# Patient Record
Sex: FEMALE | Race: ASIAN | NOT HISPANIC OR LATINO | Employment: STUDENT | URBAN - METROPOLITAN AREA
[De-identification: names, ages, dates, MRNs, and addresses within clinical notes are randomized per-mention and may not be internally consistent; named-entity substitution may affect disease eponyms.]

---

## 2024-03-18 NOTE — PROGRESS NOTES
Assessment & Plan     Dysuria  Discussed all her urinary symptoms-discussed these do not sound like typical urinary tract infection caused by a bacteria as symptoms occur for dew days at a time and few hours/day and then resolve. Discussed with her ongoing urinary symptoms, would recommend she be evaluated for other possible etiologies of the bladder symptoms and a Urology referral is entered. Will culture her urine sample today and only treat if positive. She does continue to talk about ongoing/non-resolving urinary tract infection, so I am unsure if she completely understands that it does not appear to be a urinary tract infection at this time (and also after reviewing her UA from  in January).   - UA with Microscopic; Future  - Urine Culture; Future  - UA with Microscopic  - Urine Culture  - Urine Microscopic Exam  - Adult Urology  Referral; Future    PCOS (polycystic ovarian syndrome)  We had a long discussion regarding PCOS. Based on her clinical picture of history of oligomenorrhea and hirsutism, meets criteria for PCOS.  Discussed the implications of PCOS, including increased risk of type II DM, cardiovascular disease, and risk of endometrial hyperplasia or carcinoma. She should be screened for diabetes and hypercholesterolemia annually. Will plan to return to clinic fasting to have labs including A1c completed.  Discussed goals of therapy including: prevention of endometrial hyperplasia and carcinoma from chronic anovulation (though cycles are regular now), contraception for those not pursuing pregnancy, management/identification of underlying metabolic abnormalities and risk reduction for diabetes and cardiovascular disease, amelioration of hyperandrogenic syndromes.  She has concerns with use of hormonal medications and impact of fertility in the future and this is discussed along with options for endometrial protection including combined vs progesterone only hormonal medications.   Discussed  fertility implications of anovulation, potential difficulty conceiving and/or need for ovulation induction.  Discussed weight loss through diet and exercise. A 5-10% reduction in body weight may restore normal menstrual cycles and decrease risks of metabolic syndrome and endometrial hyperplasia. Diet and exersice for weight loss tend to improve insulin resistance and hyperandrogenism. She is requesting referral to [facility] center and this is placed. Can also consider use of Metformin and discussed this medication. Will plan to have patient follow up after labs completed to discuss Metformin vs hormonal medications in further detail. Patient is given an opportunity to ask questions and have them answered.  - Hemoglobin A1c; Future  - Adult Comprehensive Weight Management  Referral; Future  - Follicle stimulating hormone; Future  - Luteinizing Hormone; Future  - Prolactin; Future  - Testosterone Free and Total; Future  - TSH with free T4 reflex; Future  - Glucose; Future  - Lipid panel reflex to direct LDL Fasting; Future    47 minutes spent by me on the date of the encounter doing chart review, history and exam, documentation and further activities per the note       Subjective   Georgiana is a 24 year old, presenting for the following health issues:  UTI    HPI       Genitourinary - Female  Onset/Duration: Since 1/1/2024 intermittent-NO current symptoms-these have resolved at this time.  Description:   Painful urination (Dysuria): YES           Frequency: YES  Blood in urine (Hematuria): No  Delay in urine (Hesitency): YES  Intensity: 8/10  Progression of Symptoms:  intermittent  Accompanying Signs & Symptoms:  Fever/chills: No  Flank pain: YES  Nausea and vomiting: No  Vaginal symptoms: none  Abdominal/Pelvic Pain: No  History:   History of frequent UTI s: YES  History of kidney stones: No  Sexually Active: YES  Possibility of pregnancy: No  Precipitating or alleviating factors: None  Therapies tried and outcome:  Azo    Patient presents with concerns of an ongoing urinary tract infection. Symptoms began in January-when they are present they usually last 2-3 days and are presents for about 2-3 hours during the day and then resolve until the next day. Lately the symptoms seem to be lasting over more days. Uses AZO and an OTC (non antibiotic) medication from [facility] to resolve symptoms. Has also previously used an antibiotic from [facility] for the symptoms. Was seen at  in January for urinary symptoms (unable to pull records in  but patient has UA results visible on her phone). Review of that UA shows it was negative, no culture was done. Patient was not treated at that time with antibiotics, but states she has used antibiotics in the past for symptoms. When present, experiences dysuria, urinary frequency, urgency, voiding small amount, not feeling as though she is completely emptying and has a hard time holding the urine. Occasional low back pain, and teeth pain. All these symptoms can last 2-3 hours and then resolve-except the difficulty holding her urine seems to be ongoing.   Denies hematuria, episodes of incontinence, pelvic pain, nausea, vomiting, fever, abnormal vaginal symptoms. No bowel concerns. No prior bladder surgery, bladder injury. No known pelvic surgery, ovarian cysts, uterine fibroids/polyps.  Symptoms currently have resolved as of 2 days ago. Is sexually active, no STI concerns. Concerned this ongoing urinary tract infection is going to cause fertility issues in a few years when they are ready to start trying for pregnancy.     Additionally, would like to discuss PCOS. Patient was diagnosed in [facility] several years ago due to irregular menstrual cycles and hirsutism primarily on abdomen face. Per patient, at that time a pelvic ultrasound was normal. She started a medication that was a nutritional supplement and helped manage blood sugar levels and her cycles became normal-monthly. Has not been on it in some time.  "Cycles since November have been monthly, last 3 days with first being heavy and then the next 2 being minimal. Prior to November, would have episodes of 2-3 months between cycles, but not consistently-could still have some monthly cycles. She is sexually active, not desiring pregnancy at this time. Expresses frustration that she is not able to lose weight, has tried some diet changes, physical activity is minimal. Had been given a referral to a weight management center from  provider last year, but never followed up. She is still interested in assistance with weight loss as she knows it would be beneficial for her PCOS. Has not had fasting labs, A1c in some time. Does request labs to check hormones. Believes last pap smear was 2-3 years ago.    Review of Systems  Constitutional, neuro, ENT, endocrine, pulmonary, cardiac, gastrointestinal, genitourinary, musculoskeletal, integument and psychiatric systems are negative, except as otherwise noted.      Objective    /82 (BP Location: Right arm, Patient Position: Sitting, Cuff Size: Adult Large)   Pulse 81   Temp 98.2  F (36.8  C) (Oral)   Ht 1.702 m (5' 7\")   Wt 101.4 kg (223 lb 9.6 oz)   LMP 02/29/2024 (Exact Date)   SpO2 98%   BMI 35.02 kg/m    Body mass index is 35.02 kg/m .  Physical Exam   GENERAL: alert and no distress  MS: no gross musculoskeletal defects noted, no edema  SKIN: no suspicious lesions or rashes  PSYCH: mentation appears normal, affect normal/bright    Results for orders placed or performed in visit on 03/19/24 (from the past 24 hour(s))   UA with Microscopic   Result Value Ref Range    Color Urine Yellow Colorless, Straw, Light Yellow, Yellow    Appearance Urine Slightly Cloudy (A) Clear    Glucose Urine Negative Negative mg/dL    Bilirubin Urine Negative Negative    Ketones Urine Negative Negative mg/dL    Specific Gravity Urine 1.020 1.003 - 1.035    Blood Urine Negative Negative    pH Urine 7.0 5.0 - 7.0    Protein Albumin Urine " Negative Negative mg/dL    Urobilinogen Urine 0.2 0.2, 1.0 E.U./dL    Nitrite Urine Negative Negative    Leukocyte Esterase Urine Trace (A) Negative   Urine Microscopic Exam   Result Value Ref Range    RBC Urine 0-2 0-2 /HPF /HPF    WBC Urine 0-5 0-5 /HPF /HPF    Squamous Epithelials Urine Moderate (A) None Seen /LPF    Amorphous Crystals Urine Few (A) None Seen /HPF       Signed Electronically by: IRIS Loyola CNP

## 2024-03-19 ENCOUNTER — OFFICE VISIT (OUTPATIENT)
Dept: OBGYN | Facility: CLINIC | Age: 24
End: 2024-03-19
Payer: COMMERCIAL

## 2024-03-19 VITALS
OXYGEN SATURATION: 98 % | BODY MASS INDEX: 35.09 KG/M2 | HEIGHT: 67 IN | WEIGHT: 223.6 LBS | TEMPERATURE: 98.2 F | DIASTOLIC BLOOD PRESSURE: 82 MMHG | SYSTOLIC BLOOD PRESSURE: 135 MMHG | HEART RATE: 81 BPM

## 2024-03-19 DIAGNOSIS — R30.0 DYSURIA: Primary | ICD-10-CM

## 2024-03-19 DIAGNOSIS — E28.2 PCOS (POLYCYSTIC OVARIAN SYNDROME): ICD-10-CM

## 2024-03-19 LAB
ALBUMIN UR-MCNC: NEGATIVE MG/DL
AMORPH CRY #/AREA URNS HPF: ABNORMAL /HPF
APPEARANCE UR: ABNORMAL
BILIRUB UR QL STRIP: NEGATIVE
COLOR UR AUTO: YELLOW
GLUCOSE UR STRIP-MCNC: NEGATIVE MG/DL
HGB UR QL STRIP: NEGATIVE
KETONES UR STRIP-MCNC: NEGATIVE MG/DL
LEUKOCYTE ESTERASE UR QL STRIP: ABNORMAL
NITRATE UR QL: NEGATIVE
PH UR STRIP: 7 [PH] (ref 5–7)
RBC #/AREA URNS AUTO: ABNORMAL /HPF
SP GR UR STRIP: 1.02 (ref 1–1.03)
SQUAMOUS #/AREA URNS AUTO: ABNORMAL /LPF
UROBILINOGEN UR STRIP-ACNC: 0.2 E.U./DL
WBC #/AREA URNS AUTO: ABNORMAL /HPF

## 2024-03-19 PROCEDURE — 99204 OFFICE O/P NEW MOD 45 MIN: CPT | Performed by: NURSE PRACTITIONER

## 2024-03-19 PROCEDURE — 81001 URINALYSIS AUTO W/SCOPE: CPT | Performed by: NURSE PRACTITIONER

## 2024-03-19 PROCEDURE — 87086 URINE CULTURE/COLONY COUNT: CPT | Performed by: NURSE PRACTITIONER

## 2024-03-19 ASSESSMENT — PAIN SCALES - GENERAL: PAINLEVEL: EXTREME PAIN (8)

## 2024-03-19 NOTE — PATIENT INSTRUCTIONS
If you have any questions regarding your visit, Please contact your care team.     [facility]: [phone]  To Schedule an Appointment 24/7  Call: [phone] HOURS TELEPHONE NUMBER     Josselin Stout- APRN CNP      Marguerite Mcghee-Surgery Scheduler  Venita-Surgery Scheduler         Monday 7:30am-2:00pm    Tuesday 7:30am-4:00pm    Wednesday 7:30am-2:00pm    Thursday 7:30am-11:00am    Friday 7:30am-2:00pm [address] [address]  Phone- [phone]   Fax- [phone]     Imaging Scheduling all locations  [phone]    [facility]  [address] [place] [address]   [place] [address] [phone]         Urgent Care locations:  [facility]   Monday-Friday  10 am - 8 pm  Saturday and Sunday   9 am - 5 pm     [phone]   If you need a medication refill, please contact your pharmacy. Please allow 3 business days for your refill to be completed.  As always, Thank you for trusting us with your healthcare needs!      see additional instructions from your care team below

## 2024-03-20 LAB — BACTERIA UR CULT: NORMAL

## 2024-05-19 ENCOUNTER — HEALTH MAINTENANCE LETTER (OUTPATIENT)
Age: 24
End: 2024-05-19

## 2025-06-08 ENCOUNTER — HEALTH MAINTENANCE LETTER (OUTPATIENT)
Age: 25
End: 2025-06-08